# Patient Record
Sex: MALE | Race: OTHER | Employment: FULL TIME | URBAN - METROPOLITAN AREA
[De-identification: names, ages, dates, MRNs, and addresses within clinical notes are randomized per-mention and may not be internally consistent; named-entity substitution may affect disease eponyms.]

---

## 2024-08-02 ENCOUNTER — APPOINTMENT (OUTPATIENT)
Dept: RADIOLOGY | Facility: CLINIC | Age: 29
End: 2024-08-02
Payer: COMMERCIAL

## 2024-08-02 ENCOUNTER — OFFICE VISIT (OUTPATIENT)
Dept: URGENT CARE | Facility: CLINIC | Age: 29
End: 2024-08-02
Payer: COMMERCIAL

## 2024-08-02 VITALS
OXYGEN SATURATION: 97 % | HEART RATE: 93 BPM | DIASTOLIC BLOOD PRESSURE: 76 MMHG | RESPIRATION RATE: 18 BRPM | TEMPERATURE: 97.6 F | SYSTOLIC BLOOD PRESSURE: 122 MMHG

## 2024-08-02 DIAGNOSIS — M54.2 CERVICALGIA: Primary | ICD-10-CM

## 2024-08-02 DIAGNOSIS — Z76.89 ENCOUNTER TO ESTABLISH CARE: ICD-10-CM

## 2024-08-02 PROCEDURE — G0383 LEV 4 HOSP TYPE B ED VISIT: HCPCS

## 2024-08-02 PROCEDURE — 72040 X-RAY EXAM NECK SPINE 2-3 VW: CPT

## 2024-08-02 RX ORDER — METHOCARBAMOL 500 MG/1
500 TABLET, FILM COATED ORAL 4 TIMES DAILY
Qty: 21 TABLET | Refills: 0 | Status: SHIPPED | OUTPATIENT
Start: 2024-08-02
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
## 2024-08-02 NOTE — PATIENT INSTRUCTIONS
"Through  patient told to take muscle relaxant for comfort as needed;  Extra strength Tylenol 2 tablets every 8 hours for comfort as needed;  Cool compresses off-and-on to area of pain 15 to 20 minutes every 1-2 hours as needed; and  If significant worsening of pain proceed to emergency room for further evaluation otherwise follow-up with primary care provider within 5 to 7 days for reevaluation.    Patient Education     Dolor en el vance   Conceptos Básicos   Redactado por los médicos y editores de [employer]   ¿Qué puede causar el dolor de vance? -- El dolor de vance se produce cuando hay un problema o marina lesión en cualquiera de las partes (“estructuras”) del vance (figura 1). Entre las estructuras del vance se incluyen:   Huesos - El vance tiene 7 huesos apilados connie encima del otro. Estos huesos componen la parte superior de la columna y se llaman “vértebras cervicales”. El dolor de vance puede producirse cuando los huesos se desgastan o desarrollan crecimientos anormales (llamados “protuberancias”).   Ligamentos - Los ligamentos son tejidos rin que conectan los huesos entre sí. El daño en los ligamentos puede producirse cuando el vance hace un movimiento repentino de atrás hacia adelante (llamado “latigazo”), cassy en un accidente automovilístico.   Discos - Los discos son almohadillas que se apoyan entre los huesos. Cuando los discos cambian de forma o se salen de handley lugar, pueden provocar síntomas.   Músculos - Los músculos mantienen la berta erguida y permiten que el vance se mueva. El dolor de vance puede estar causado por marina torcedura o tensión en un músculo, por ejemplo a causa de marina sajan postura o el estrés.   Nervios - Un gran conjunto de nervios (llamado \"médula perry\") baja por el medio de la columna. Los nervios se ramifican desde la médula perry hacia todas las partes del cuerpo. Las personas pueden sufrir síntomas si los nervios están irritados o si están presionados " por huesos o discos cercanos.  ¿Qué síntomas pueden tener las personas con dolor de vance? -- Las personas pueden tener diferentes síntomas, que incluyen:   Dolor, rigidez u opresión en el vance, los hombros, la parte superior de la espalda o los brazos   Elaine de berta   Debilidad en el vance   Incapacidad de  o girar el vance   Dolor al girar o inclinar la berta   Adormecimiento o sensaciones extrañas (cassy si lo estuvieran pinchando con agujas) en los hombros o brazos   Problemas para caminar o  las piernas   No tener control de la vejiga o de las evacuaciones  ¿Daria realizarme alguna prueba? -- La mayoría de las personas no necesita hacerse pruebas. Si consulta a un médico o enfermero por dolor de vance, le hará un examen. Le palpará los huesos y los músculos, examinará el movimiento de la berta y el vance, y podría revisar la fuerza y los reflejos de los brazos.  Algunas personas sí podrían necesitar realizarse pruebas. Estas pueden ser algunas de las pruebas:   Radiografía, tomografía, resonancia magnética nuclear u otros estudios de imagen - Los estudios de imagen crean imágenes del interior del cuerpo.   Pruebas en los músculos o los nervios para jose si los músculos y los nervios funcionan normalmente  ¿Hay algo que pueda hacer por mi cuenta para sentirme mejor? -- Sí. Para aliviar los síntomas, puede hacer lo siguiente:   Bertin marina medicina para el dolor - Entre ellas se cuentan el paracetamol (acetaminofén) (ejemplo de brain comercial: Tylenol) o marina medicina JADE cassy el ibuprofeno (ejemplos de marcas comerciales: Advil, Motrin) o naproxeno (ejemplos de marcas comerciales: Aleve, Naprosyn).   Colocar hielo en la meng para reducir el dolor - Coloque un paquete de gel frío, marina bolsa de hielo o marina bolsa de vegetales congelados en la meng lesionada varias veces al día, ashley 15 minutos cada vez. Coloque marina toalla delgada entre el hielo (o el objeto frío) y la piel.   Aplicar calor en  "la meng para reducir el dolor y la rigidez - Dese marina ducha o baño caliente, o colóquese marina toalla caliente o marina almohadilla térmica (en la configuración de intensidad más baja) sobre la meng. Aplique calor ashley 15 minutos a la vez. No use nada demasiado caliente que podría quemarle la piel.   Hacer ejercicios de vance - Hay diferentes ejercicios que pueden servirle para estirar el vance, los hombros y los músculos de la espalda, además de fortalecerlos. Podrían consistir, por ejemplo, en girar o inclinar suavemente la berta, hacer movimientos rotativos con los hombros y hacer otros estiramientos. Pregúntele a handley médico o enfermero si debe hacer ejercicios y cuáles pueden ayudar a aliviar los síntomas.   Disminuir el estrés - El estrés puede empeorar el dolor y evitar que los síntomas desaparezcan. Trate de encontrar formas saludables de manejar el estrés. A algunas personas las ayuda probar un tratamiento llamado \"reducción del estrés basada en técnicas contemplativas\". Consiste en asistir a un programa grupal para practicar la relajación y la meditación.   Mejorar handley postura - Intente mantener el vance recto en relación con el cuerpo y evite encorvarse hacia adelante. Cuando tenga que permanecer quieto (por ejemplo, al trabajar en un escritorio), podría ser útil que cambie de posición con frecuencia. Al dormir, use almohadas para alinear la berta y el vance con el cuerpo, y trate de no dormir boca abajo con la berta de lado.  ¿Qué otros tratamientos podría realizarme? -- A algunas personas, la acupuntura o los masajes las ayudan a aliviar el dolor a corto plazo.  Handley médico o enfermero puede sugerir otros tratamientos si el dolor de vance no disminuye después de tratarlo en casa. Por ejemplo, es posible que le recomiende que sunshine a un experto en ejercicios llamado fisioterapeuta o que sugiera otras medicinas o marina inyección de marina medicina para adormecer el vance.  ¿Qué tratamientos no son de ayuda? -- " "La mayoría de los médicos no recomienda que las personas usen cuellos ortopédicos, especialmente ashley períodos largos. Si le parece que un vance ortopédico jennie handley dolor, use connie blando ashley menos de 3 horas seguidas. Usar un vance ortopédico ashley mucho tiempo puede debilitar los músculos del vance.  Otros tratamientos que no son de ayuda son la cirugía o un tratamiento en el que se rustam de la berta para estirar el vance (llamado \"tracción cervical\").  ¿Se puede evitar el dolor de vance? -- Para evitar el dolor de vance, puede:   Tener marina buena postura - Sostenga la berta recta y los hombros bajos.   Evitar sentarse en la misma posición ashley mucho tiempo.   Evitar hacer tareas por encima de la berta ashley mucho tiempo.   Evitar mirar hacia abajo cuando usa el teléfono o page papeles. Póngaselos a la altura de los ojos.   Evitar poner peso o presión en la parte superior de la espalda.   Mantener el vance alineado con el kashif del cuerpo al dormir.   Si trabaja en un escritorio, asegúrese de que handley computadora esté a la altura de los ojos. Use marina silla con respaldo.   Si usa el teléfono con frecuencia, use un auricular o un dispositivo tab libres si es posible. No sostenga el teléfono entre la oreja y el hombro.  ¿Cuándo krista llamar al médico? -- Pida ayuda de emergencia de inmediato (en [place] llame al 9-1-1) si:   Se le endurece el vance y le ava moverlo y tiene malestar, fiebre o escalofríos.   Tiene adormecimiento o debilidad de nueva aparición en los brazos o las piernas.  Llame a handley médico o enfermero si:   Tiene dolor después de marina lesión en la berta o el vance.   Tiene un dolor jazmyn.   No puede controlar la orina o las evacuaciones.   El dolor no mejora después de tratarlo en casa ashley marina semana.  Todos los artículos se actualizan a medida que se descubre nueva evidencia y culmina nuestro proceso de evaluación por homólogos   Gypsy artículo se recuperó de " UpToDate el: Mar 14, 2024.  Artículo 33933 Versión [id number].[id number]  Release: 32.2.4 - C32.72  © 2024 UpToDate, Inc. Todos los derechos reservados.  figura 1: Anatomía del vance     [facility] [address] Versión 2.0  Exención de responsabilidad y uso de la información del consumidor   Descargo de responsabilidad: esta información generalizada es un resumen limitado de información sobre el diagnóstico, el tratamiento y/o los medicamentos. No pretende ser exhaustiva y se debe utilizar cassy herramienta para ayudar al usuario a comprender y/o evaluar las posibles opciones de diagnóstico y tratamiento. No incluye toda la información sobre afecciones, tratamientos, medicamentos, efectos secundarios o riesgos puedan ser aplicables a un paciente específico. No tiene el propósito de servir cassy recomendación médica ni de sustituir la recomendación médica, el diagnóstico o el tratamiento de un profesional de atención médica que se base en el examen y la evaluación de helen profesional de la fiona respecto a las circunstancias específicas y únicas del paciente. Los pacientes deben hablar con un profesional de atención médica para obtener información completa sobre handley fiona, cuestiones médicas y opciones de tratamiento, incluidos los riesgos o los beneficios relacionados con el uso de medicamentos. Esta información no certifica que los tratamientos o medicamentos bari seguros, eficaces o estén aprobados para tratar a un paciente específico. [employer] y pranav afiliados renuncian a cualquier garantía o responsabilidad relacionada con esta información o el uso de la misma.El uso de esta información está sujeto a las Condiciones de uso, disponibles en https://www.wolInvestGlasskluwer.com/en/know/clinical-effectiveness-terms. 2024© [employer] y pranav afiliados y/o licenciantes. Todos los derechos reservados.  Copyright   © 2024 [employer] Todos los derechos reservados.      Patient Education     Neck pain   The Basics   Written by the  "doctors and editors at [facility]   What can cause neck pain? -- Neck pain happens when there is a problem with or injury to any of the parts (\"structures\") of the neck (figure 1). The structures in the neck include:   Bones - The neck has 7 bones that are stacked on top of each other. These bones make up the top part of the spine and are called the \"cervical vertebrae.\" Neck pain can happen when the bones get worn down or develop abnormal growths (called \"spurs\").   Ligaments - Ligaments are strong tissues that connect bones to other bones. Ligament damage can happen when the neck moves back and forth suddenly (called \"whiplash\"), such as in a car accident.   Discs - Discs are cushions that sit between the bones. When the discs change shape or move out of position, people can have symptoms.   Muscles - Muscles hold the head up and make the neck move. Neck pain can be caused by muscle strain or tension, such as from poor posture or stress.   Nerves - A large bundle of nerves (called \"the spinal cord\") travels down the middle of the spine. Nerves branch off from the spinal cord to all parts of the body. People can have symptoms if their nerves are irritated or pushed on by nearby bones or discs.  What symptoms can people with neck pain have? -- People can have different symptoms that include:   Pain, stiffness, or tightness in the neck, shoulders, upper back, or arms   Headaches   Neck weakness   Being unable to move or turn the neck   Pain when turning or tilting the head   Numbness or strange feelings (such as pins and needles) in the shoulders or arms   Trouble walking or moving the legs   Having no control over the bladder or bowels  Do I need to have tests? -- Most people do not need any tests. If you see a doctor or nurse for neck pain, they will do an exam. They will feel your bones and muscles, check how your head and neck move, and might check the strength and reflexes in your arms.  But some people might need " "tests. Tests can include:   X-ray, CT scan, MRI scan, or other imaging tests - Imaging tests create pictures of the inside of the body.   Muscle or nerve tests to see if the muscles and nerves work normally  Is there anything I can do on my own to feel better? -- Yes. To relieve your symptoms, you can:   Take a pain-relieving medicine - Examples include acetaminophen (sample brand name: Tylenol) or an NSAID such as ibuprofen (sample brand names: Advil, Motrin) or naproxen (sample brand names: Aleve, Naprosyn).   Put ice on the area to reduce pain - Put a cold gel pack, bag of ice, or bag of frozen vegetables on the injured area a few times a day, for 15 minutes each time. Put a thin towel between the ice (or other cold object) and the skin.   Put heat on the area to reduce pain and stiffness - Take a hot shower or hot bath, or put a hot towel or heating pad (on the \"low\" setting) on the area. Apply heat for 15 minutes at a time. Don't use anything too hot that could burn your skin.   Do neck exercises - Different exercises can stretch the neck, shoulder, and back muscles and help make them stronger. These might involve turning or tilting your head gently, rolling your shoulders, and doing other stretches. Ask your doctor or nurse if you should do exercises and which ones can help your symptoms.   Reduce stress - Stress can make pain worse and prevent symptoms from getting better. Try to find healthy ways to manage your stress. Some people find that it helps to try something called \"mindfulness-based stress reduction.\" This involves going to a group program to practice relaxation and meditation.   Improve your posture - Try to keep your neck straight in line with your body and avoid hunching forward. When you have to stay in 1 place, like while working at a desk, it might help to adjust your position often. When you sleep, use pillows to keep your head and neck in line with your body. Try to avoid sleeping on your " "stomach with your head turned to the side.  What other treatments might I have? -- Some people find that acupuncture or massage help relieve pain in the short term.  Your doctor or nurse can suggest other treatments if your neck pain doesn't improve after you treat it at home. For example, they might suggest that you see an exercise expert, called a physical therapist. Or your doctor might suggest other medicines, or an injection of a numbing medicine into your neck.  What treatments are not helpful? -- Most doctors do not recommend that people wear neck collars, especially for long periods of time. If you find that a neck collar eases your pain, wear a soft neck collar for less than 3 hours at a time. Wearing a neck collar for too long can make your neck muscles get too weak.  Other treatments that are not helpful include surgery or a treatment that pulls on the head to lengthen the neck (called \"cervical traction\").  Can neck pain be prevented? -- To help prevent neck pain, you can:   Use good posture - Hold your head up and keep your shoulders down.   Avoid sitting in the same position for too long.   Avoid doing work above your head for too long.   Avoid looking down at your phone or papers. Hold them at eye level.   Avoid putting weight or pressure on your upper back.   Keep your neck in line with the rest of your body when you sleep.   If you work at a desk, make sure that your computer is at eye level. Use a supportive chair.   If you use the telephone often, use a headset or hands-free option if possible. Do not hold the phone between your ear and shoulder.  When should I call the doctor? -- Call for emergency help right away (in the [place] and [place], call 9-1-1) if:   Your neck becomes stiff and hard to move and you are feeling sick or develop a fever or chills.   You have new numbness or weakness in your arms or legs.  Call your doctor or nurse if:   You have pain after a head or neck injury.   You have " severe pain.   You lose control of your bladder or bowels.   Your pain doesn't get better after you treat it at home for 1 week.  All topics are updated as new evidence becomes available and our peer review process is complete.  This topic retrieved from [employer] on: Mar 14, 2024.  Topic 86206 Version 24.0  Release: 32.2.4 - C32.72  © 2024 UpToDate, Inc. and/or its affiliates. All rights reserved.  figure 1: Anatomy of the neck     Graphic 60522 Version 2.0  Consumer Information Use and Disclaimer   Disclaimer: This generalized information is a limited summary of diagnosis, treatment, and/or medication information. It is not meant to be comprehensive and should be used as a tool to help the user understand and/or assess potential diagnostic and treatment options. It does NOT include all information about conditions, treatments, medications, side effects, or risks that may apply to a specific patient. It is not intended to be medical advice or a substitute for the medical advice, diagnosis, or treatment of a health care provider based on the health care provider's examination and assessment of a patient's specific and unique circumstances. Patients must speak with a health care provider for complete information about their health, medical questions, and treatment options, including any risks or benefits regarding use of medications. This information does not endorse any treatments or medications as safe, effective, or approved for treating a specific patient. UpToDate, Inc. and its affiliates disclaim any warranty or liability relating to this information or the use thereof.The use of this information is governed by the Terms of Use, available at https://www.wolterskluwer.com/en/know/clinical-effectiveness-terms. 2024© UpToDate, Inc. and its affiliates and/or licensors. All rights reserved.  Copyright   © 2024 UpToDate, Inc. and/or its affiliates. All rights reserved.